# Patient Record
Sex: FEMALE | Race: AMERICAN INDIAN OR ALASKA NATIVE
[De-identification: names, ages, dates, MRNs, and addresses within clinical notes are randomized per-mention and may not be internally consistent; named-entity substitution may affect disease eponyms.]

---

## 2019-12-05 ENCOUNTER — HOSPITAL ENCOUNTER (EMERGENCY)
Dept: HOSPITAL 5 - ED | Age: 39
Discharge: HOME | End: 2019-12-05
Payer: MEDICAID

## 2019-12-05 VITALS — SYSTOLIC BLOOD PRESSURE: 121 MMHG | DIASTOLIC BLOOD PRESSURE: 58 MMHG

## 2019-12-05 DIAGNOSIS — F17.200: ICD-10-CM

## 2019-12-05 DIAGNOSIS — Z88.6: ICD-10-CM

## 2019-12-05 DIAGNOSIS — B34.9: ICD-10-CM

## 2019-12-05 DIAGNOSIS — F12.10: ICD-10-CM

## 2019-12-05 DIAGNOSIS — Z88.0: ICD-10-CM

## 2019-12-05 DIAGNOSIS — Z79.899: ICD-10-CM

## 2019-12-05 DIAGNOSIS — Z90.49: ICD-10-CM

## 2019-12-05 DIAGNOSIS — J20.9: Primary | ICD-10-CM

## 2019-12-05 DIAGNOSIS — Z21: ICD-10-CM

## 2019-12-05 DIAGNOSIS — N18.9: ICD-10-CM

## 2019-12-05 LAB
BASOPHILS # (AUTO): 0 K/MM3 (ref 0–0.1)
BASOPHILS NFR BLD AUTO: 0.9 % (ref 0–1.8)
BUN SERPL-MCNC: 15 MG/DL (ref 7–17)
BUN/CREAT SERPL: 13 %
CALCIUM SERPL-MCNC: 9.1 MG/DL (ref 8.4–10.2)
EOSINOPHIL # BLD AUTO: 0 K/MM3 (ref 0–0.4)
EOSINOPHIL NFR BLD AUTO: 0.9 % (ref 0–4.3)
HCT VFR BLD CALC: 38.4 % (ref 30.3–42.9)
HEMOLYSIS INDEX: 5
HGB BLD-MCNC: 13.1 GM/DL (ref 10.1–14.3)
LYMPHOCYTES # BLD AUTO: 2.3 K/MM3 (ref 1.2–5.4)
LYMPHOCYTES NFR BLD AUTO: 42.6 % (ref 13.4–35)
MCHC RBC AUTO-ENTMCNC: 34 % (ref 30–34)
MCV RBC AUTO: 93 FL (ref 79–97)
MONOCYTES # (AUTO): 0.7 K/MM3 (ref 0–0.8)
MONOCYTES % (AUTO): 12.7 % (ref 0–7.3)
PLATELET # BLD: 249 K/MM3 (ref 140–440)
RBC # BLD AUTO: 4.11 M/MM3 (ref 3.65–5.03)

## 2019-12-05 PROCEDURE — 84703 CHORIONIC GONADOTROPIN ASSAY: CPT

## 2019-12-05 PROCEDURE — 96361 HYDRATE IV INFUSION ADD-ON: CPT

## 2019-12-05 PROCEDURE — 94644 CONT INHLJ TX 1ST HOUR: CPT

## 2019-12-05 PROCEDURE — 87400 INFLUENZA A/B EACH AG IA: CPT

## 2019-12-05 PROCEDURE — 99284 EMERGENCY DEPT VISIT MOD MDM: CPT

## 2019-12-05 PROCEDURE — 93010 ELECTROCARDIOGRAM REPORT: CPT

## 2019-12-05 PROCEDURE — 80048 BASIC METABOLIC PNL TOTAL CA: CPT

## 2019-12-05 PROCEDURE — 96365 THER/PROPH/DIAG IV INF INIT: CPT

## 2019-12-05 PROCEDURE — 85025 COMPLETE CBC W/AUTO DIFF WBC: CPT

## 2019-12-05 PROCEDURE — 71046 X-RAY EXAM CHEST 2 VIEWS: CPT

## 2019-12-05 PROCEDURE — 93005 ELECTROCARDIOGRAM TRACING: CPT

## 2019-12-05 PROCEDURE — 96375 TX/PRO/DX INJ NEW DRUG ADDON: CPT

## 2019-12-05 PROCEDURE — 94640 AIRWAY INHALATION TREATMENT: CPT

## 2019-12-05 PROCEDURE — 96376 TX/PRO/DX INJ SAME DRUG ADON: CPT

## 2019-12-05 PROCEDURE — 36415 COLL VENOUS BLD VENIPUNCTURE: CPT

## 2019-12-05 NOTE — XRAY REPORT
CHEST 2 VIEWS 



INDICATION / CLINICAL INFORMATION:

cough, wheeze, sob.



COMPARISON: 

None available.



FINDINGS:



SUPPORT DEVICES: None.

HEART / MEDIASTINUM: No significant abnormality. 

LUNGS / PLEURA: No significant pulmonary or pleural abnormality. .No pneumothorax. 



ADDITIONAL FINDINGS: No significant additional findings.



IMPRESSION:

1. No acute findings.



Signer Name: Mario Brumfield MD 

Signed: 12/5/2019 1:18 PM

 Workstation Name: [id number]

## 2019-12-05 NOTE — EMERGENCY DEPARTMENT REPORT
- General


Chief Complaint: Dyspnea/Respdistress


Stated Complaint: ZECHARIAH


Time Seen by Provider: 12/05/19 10:12


Source: patient, EMS


Mode of arrival: Stretcher


Limitations: No Limitations





- History of Present Illness


Initial Comments: 





39-year-old female the past medical history of asthma, HIV, chronic kidney 

disease and previous cholecystectomy presents to the hospital complaining of 

cough, nausea, vomiting, and diarrhea for a few days.  Patient complains of 

cough productive of white sputum.  She's having nausea, vomiting, and diarrhea 

and not tolerating by mouth intake.  She states that she has not had a asthma 

attack and for years but she's had increased wheezing and shortness of breath 

for the last few days.  Today she had wheezing with associated syncope and was 

told her room air saturation was 87%.  She received a DuoNeb and Solu-Medrol via

EMS and refused transport to the hospital.  He does not currently have any 

bronchodilators at home.  She does smoke cigarettes.  She states her viral load 

is undetectable.  She gives a history of 3 previous intubations.  No fever 

reported but positive chills.  Patient did receive a flu and pneumonia shot this

season.  Her doctors are affiliated with [facility].





- Related Data


                                  Previous Rx's











 Medication  Instructions  Recorded  Last Taken  Type


 


ALBUTEROL Inhaler (OR & NICU) 2 puff IH QID PRN #8.5 gram 12/05/19 Unknown Rx





[ProAir HFA Inhaler]    


 


Azithromycin [Zithromax TAB] 250 mg PO QDAY #4 tablet 12/05/19 Unknown Rx


 


Benzonatate [Tessalon Perles] 100 mg PO Q8HR PRN #20 capsule 12/05/19 Unknown Rx


 


HYDROcodone/APAP 5-325 [Wolbach 1 each PO Q6HR PRN #15 tablet 12/05/19 Unknown Rx





5/325]    


 


Inhaler, Assist Devices [Space 1 each MC PRN #1 spacer 12/05/19 Unknown Rx





Chamber Plus]    


 


Prednisone [predniSONE 10 mg 10 mg PO .TAPER #1 tab.ds.pk 12/05/19 Unknown Rx





(6-Day Pack, 21 Tabs)]    


 


Promethazine [Phenergan] 25 mg PO Q6HR PRN #20 tab 12/05/19 Unknown Rx











                                    Allergies











Allergy/AdvReac Type Severity Reaction Status Date / Time


 


aspirin Allergy  Hives Verified 12/05/19 10:15


 


Penicillins Allergy  Hives Verified 12/05/19 10:15














ED Review of Systems


ROS: 


Stated complaint: ZECHARIAH


Other details as noted in HPI





Comment: All other systems reviewed and negative





ED Past Medical Hx





- Past Medical History


Previous Medical History?: Yes


Hx Renal Disease: Yes (CKD)


Hx Asthma: Yes


Hx HIV: Yes


Additional medical history: bronchtitis





- Surgical History


Past Surgical History?: Yes


Hx Cholecystectomy: Yes





- Social History


Smoking Status: Current Every Day Smoker


Substance Use Type: Alcohol, Marijuana





- Medications


Home Medications: 


                                Home Medications











 Medication  Instructions  Recorded  Confirmed  Last Taken  Type


 


ALBUTEROL Inhaler (OR & NICU) 2 puff IH QID PRN #8.5 gram 12/05/19  Unknown Rx





[ProAir HFA Inhaler]     


 


Azithromycin [Zithromax TAB] 250 mg PO QDAY #4 tablet 12/05/19  Unknown Rx


 


Benzonatate [Tessalon Perles] 100 mg PO Q8HR PRN #20 capsule 12/05/19  Unknown 

Rx


 


HYDROcodone/APAP 5-325 [Wolbach 1 each PO Q6HR PRN #15 tablet 12/05/19  Unknown Rx





5/325]     


 


Inhaler, Assist Devices [Space 1 each MC PRN #1 spacer 12/05/19  Unknown Rx





Chamber Plus]     


 


Prednisone [predniSONE 10 mg 10 mg PO .TAPER #1 tab.ds.pk 12/05/19  Unknown Rx





(6-Day Pack, 21 Tabs)]     


 


Promethazine [Phenergan] 25 mg PO Q6HR PRN #20 tab 12/05/19  Unknown Rx














ED Physical Exam





- General


Limitations: No Limitations





- Other


Other exam information: 





General: No acute distress


Head: Atraumatic


Eyes: normal appearance


ENT: Moist mucous membranes, no sinus pressure tenderness, mild left tm redness


Neck: Normal appearance, no midline tenderness


Chest: Bilateral wheezing, tachypnea, frequent coughing, right-sided chest wall 

tenderness


CV: Regular rate and rhythm


Abdomen: Soft, normal bowel sounds, nontender, nondistended, no rebound or 

guarding


Back: Normal inspection


Extremity: Normal inspection infection, full range of motion, no calf tenderness

 or leg edema


Neuro: Alert O x 3, no facial asymmetry, speech clear, no gross motor sensory 

deficit


Psych: Appropriate behavior


Skin: No rash





ED Course


                                   Vital Signs











  12/05/19 12/05/19 12/05/19





  10:10 10:15 10:16


 


Temperature  98.4 F 


 


Pulse Rate  83 82


 


Pulse Rate [   





Right Lower   





Lobe]   


 


Respiratory  29 H 24





Rate   


 


Respiratory   





Rate [Right   





Lower Lobe]   


 


Blood Pressure  126/70 117/75


 


O2 Sat by Pulse 99 97 97





Oximetry   














  12/05/19 12/05/19 12/05/19





  10:25 10:46 10:58


 


Temperature   


 


Pulse Rate  100 H 


 


Pulse Rate [   80





Right Lower   





Lobe]   


 


Respiratory 24 24 





Rate   


 


Respiratory   18





Rate [Right   





Lower Lobe]   


 


Blood Pressure  127/56 


 


O2 Sat by Pulse 97 97 





Oximetry   














  12/05/19 12/05/19 12/05/19





  11:00 11:30 12:22


 


Temperature   


 


Pulse Rate 71 73 


 


Pulse Rate [   





Right Lower   





Lobe]   


 


Respiratory 16 22 





Rate   


 


Respiratory   





Rate [Right   





Lower Lobe]   


 


Blood Pressure 127/56 120/67 98/73


 


O2 Sat by Pulse 100 100 99





Oximetry   














  12/05/19 12/05/19





  12:30 13:00


 


Temperature  


 


Pulse Rate 75 


 


Pulse Rate [  





Right Lower  





Lobe]  


 


Respiratory 15 





Rate  


 


Respiratory  





Rate [Right  





Lower Lobe]  


 


Blood Pressure 115/56 118/63


 


O2 Sat by Pulse 98 98





Oximetry  














ED Medical Decision Making





- Lab Data


Result diagrams: 


                                 12/05/19 10:36





                                 12/05/19 10:36








                                   Lab Results











  12/05/19 12/05/19 12/05/19 Range/Units





  10:36 10:36 10:36 


 


WBC  5.4    (4.5-11.0)  K/mm3


 


RBC  4.11    (3.65-5.03)  M/mm3


 


Hgb  13.1    (10.1-14.3)  gm/dl


 


Hct  38.4    (30.3-42.9)  %


 


MCV  93    (79-97)  fl


 


MCH  32    (28-32)  pg


 


MCHC  34    (30-34)  %


 


RDW  14.2    (13.2-15.2)  %


 


Plt Count  249    (140-440)  K/mm3


 


Lymph % (Auto)  42.6 H    (13.4-35.0)  %


 


Mono % (Auto)  12.7 H    (0.0-7.3)  %


 


Eos % (Auto)  0.9    (0.0-4.3)  %


 


Baso % (Auto)  0.9    (0.0-1.8)  %


 


Lymph #  2.3    (1.2-5.4)  K/mm3


 


Mono #  0.7    (0.0-0.8)  K/mm3


 


Eos #  0.0    (0.0-0.4)  K/mm3


 


Baso #  0.0    (0.0-0.1)  K/mm3


 


Seg Neutrophils %  42.9    (40.0-70.0)  %


 


Seg Neutrophils #  2.3    (1.8-7.7)  K/mm3


 


Sodium   138   (137-145)  mmol/L


 


Potassium   3.7   (3.6-5.0)  mmol/L


 


Chloride   105.4   ()  mmol/L


 


Carbon Dioxide   15 L   (22-30)  mmol/L


 


Anion Gap   21   mmol/L


 


BUN   15   (7-17)  mg/dL


 


Creatinine   1.2   (0.7-1.2)  mg/dL


 


Estimated GFR   50   ml/min


 


BUN/Creatinine Ratio   13   %


 


Glucose   99   ()  mg/dL


 


Calcium   9.1   (8.4-10.2)  mg/dL


 


HCG, Qual    Negative  (Negative)  


 


Influenza A (Rapid)     (Negative)  


 


Influenza B (Rapid)     (Negative)  














  12/05/19 Range/Units





  10:56 


 


WBC   (4.5-11.0)  K/mm3


 


RBC   (3.65-5.03)  M/mm3


 


Hgb   (10.1-14.3)  gm/dl


 


Hct   (30.3-42.9)  %


 


MCV   (79-97)  fl


 


MCH   (28-32)  pg


 


MCHC   (30-34)  %


 


RDW   (13.2-15.2)  %


 


Plt Count   (140-440)  K/mm3


 


Lymph % (Auto)   (13.4-35.0)  %


 


Mono % (Auto)   (0.0-7.3)  %


 


Eos % (Auto)   (0.0-4.3)  %


 


Baso % (Auto)   (0.0-1.8)  %


 


Lymph #   (1.2-5.4)  K/mm3


 


Mono #   (0.0-0.8)  K/mm3


 


Eos #   (0.0-0.4)  K/mm3


 


Baso #   (0.0-0.1)  K/mm3


 


Seg Neutrophils %   (40.0-70.0)  %


 


Seg Neutrophils #   (1.8-7.7)  K/mm3


 


Sodium   (137-145)  mmol/L


 


Potassium   (3.6-5.0)  mmol/L


 


Chloride   ()  mmol/L


 


Carbon Dioxide   (22-30)  mmol/L


 


Anion Gap   mmol/L


 


BUN   (7-17)  mg/dL


 


Creatinine   (0.7-1.2)  mg/dL


 


Estimated GFR   ml/min


 


BUN/Creatinine Ratio   %


 


Glucose   ()  mg/dL


 


Calcium   (8.4-10.2)  mg/dL


 


HCG, Qual   (Negative)  


 


Influenza A (Rapid)  Negative  (Negative)  


 


Influenza B (Rapid)  Negative  (Negative)  














- EKG Data


-: EKG Interpreted by Me


EKG shows normal: sinus rhythm, ST-T waves (no stemi)


Rate: normal (81)





- EKG Data


When compared to previous EKG there are: previous EKG unavailable





- Radiology Data


Radiology results: report reviewed





CHEST 2 VIEWS INDICATION / CLINICAL INFORMATION: cough, wheeze, sob. COMPARISON:

None available. FINDINGS: SUPPORT DEVICES: None. HEART / MEDIASTINUM: No 

significant abnormality. LUNGS / PLEURA: No significant pulmonary or pleural 

abnormality. .No pneumothorax. ADDITIONAL FINDINGS: No significant additional 

findings. IMPRESSION: 1. No acute findings. 





- Medical Decision Making





Patient did have episode of vomiting after eating and also having frequent 

coughing.  Abdomen remains nontender.  Wheezing has resolved.  Overall feels 

better with a room air saturation in the high 90s. mild otitis noted. given 1 

dose of azithromycin and ed and will be continued.f/u advised





- Differential Diagnosis


bronchitis, flu, viral syndrome, pneumonia, otitis or sinusitis


Critical Care Time: No


Critical care attestation.: 


If time is entered above; I have spent that time in minutes in the direct care 

of this critically ill patient, excluding procedure time.








ED Disposition


Clinical Impression: 


 Acute bronchitis, Viral syndrome, HIV (human immunodeficiency virus infection)





Disposition: DC-01 TO HOME OR SELFCARE


Is pt being admited?: No


Does the pt Need Aspirin: No


Condition: Stable


Instructions:  Acute Bronchitis (ED), Human Immunodeficiency Virus Infection 

(ED), Viral Syndrome (ED)


Additional Instructions: 


Take the medication as prescribed.  Follow-up with your doctor or doctor/clinic 

provided.  Return if symptoms worsen as indicated by your discharge 

instructions.


Prescriptions: 


HYDROcodone/APAP 5-325 [Wolbach 5/325] 1 each PO Q6HR PRN #15 tablet


 PRN Reason: Pain


Promethazine [Phenergan] 25 mg PO Q6HR PRN #20 tab


 PRN Reason: Nausea


Prednisone [predniSONE 10 mg (6-Day Pack, 21 Tabs)] 10 mg PO .TAPER #1 tab.ds.pk


ALBUTEROL Inhaler (OR & NICU) [ProAir HFA Inhaler] 2 puff IH QID PRN #8.5 gram


 PRN Reason: Shortness Of Breath


Inhaler, Assist Devices [Space Chamber Plus] 1 each MC PRN #1 spacer


Benzonatate [Tessalon Perles] 100 mg PO Q8HR PRN #20 capsule


 PRN Reason: Cough


Azithromycin [Zithromax TAB] 250 mg PO QDAY #4 tablet


Referrals: 


PRIMARY CARE,MD [Referring] - 3-5 Days


Time of Disposition: 15:12

## 2019-12-08 ENCOUNTER — HOSPITAL ENCOUNTER (EMERGENCY)
Dept: HOSPITAL 5 - ED | Age: 39
Discharge: LEFT BEFORE DISCHARGE | End: 2019-12-08
Payer: MEDICAID

## 2019-12-08 VITALS — DIASTOLIC BLOOD PRESSURE: 91 MMHG | SYSTOLIC BLOOD PRESSURE: 154 MMHG

## 2019-12-08 DIAGNOSIS — H92.03: Primary | ICD-10-CM

## 2019-12-08 DIAGNOSIS — Z53.21: ICD-10-CM

## 2022-07-09 ENCOUNTER — TELEPHONE ENCOUNTER (OUTPATIENT)
Dept: URBAN - METROPOLITAN AREA CLINIC 121 | Facility: CLINIC | Age: 42
End: 2022-07-09

## 2022-07-10 ENCOUNTER — TELEPHONE ENCOUNTER (OUTPATIENT)
Dept: URBAN - METROPOLITAN AREA CLINIC 121 | Facility: CLINIC | Age: 42
End: 2022-07-10